# Patient Record
Sex: FEMALE | Race: BLACK OR AFRICAN AMERICAN | NOT HISPANIC OR LATINO | Employment: FULL TIME | URBAN - METROPOLITAN AREA
[De-identification: names, ages, dates, MRNs, and addresses within clinical notes are randomized per-mention and may not be internally consistent; named-entity substitution may affect disease eponyms.]

---

## 2020-11-12 ENCOUNTER — HOSPITAL ENCOUNTER (EMERGENCY)
Facility: HOSPITAL | Age: 45
Discharge: HOME | End: 2020-11-12
Attending: FAMILY MEDICINE
Payer: COMMERCIAL

## 2020-11-12 VITALS
RESPIRATION RATE: 20 BRPM | TEMPERATURE: 99 F | HEIGHT: 62 IN | OXYGEN SATURATION: 97 % | DIASTOLIC BLOOD PRESSURE: 81 MMHG | BODY MASS INDEX: 40.57 KG/M2 | WEIGHT: 220.44 LBS | HEART RATE: 101 BPM | SYSTOLIC BLOOD PRESSURE: 169 MMHG

## 2020-11-12 DIAGNOSIS — R04.0 EPISTAXIS: Primary | ICD-10-CM

## 2020-11-12 PROCEDURE — 99282 EMERGENCY DEPT VISIT SF MDM: CPT

## 2020-11-12 RX ORDER — OXYMETAZOLINE HCL 0.05 %
1 SPRAY, NON-AEROSOL (ML) NASAL
Status: DISCONTINUED | OUTPATIENT
Start: 2020-11-12 | End: 2020-11-12

## 2020-11-13 NOTE — DISCHARGE INSTRUCTIONS
Regarding EPISTAXIS, for treatment, I advised the patient to: sit up and lean forward to prevent the swallowing of blood; spit blood and saliva into a bowl; apply pressure to nose using 2 fingers to pinch nose shut for 10 minutes; breathe through your mouth; apply ice (use a cold pack or put crushed ice in a bag, cover with a towel, and place on the bridge of nose); and consider nasal packing by using a cotton ball, tissue, or gauze bandage inserted into nostril to stop the bleeding. To prevent epistaxis: instructed patient to avoid nose picking and blowing nose too hard; avoid irritants such as tobacco smoke and chemical sprays such as  that contain ammonia; use a cool mist humidifier to add moisture to the air and help keep nose moist; and apply a small amount of petroleum jelly inside nostrils to help keep nose from drying out or getting irritated. Instructed patient to follow up with primary healthcare provider or otolaryngologist as soon as possible for further evaluation and treatment and to return to the ER if they experience difficulty breathing or talking, weakness, or for any complications or concerns.

## 2020-11-13 NOTE — ED PROVIDER NOTES
SCRIBE #1 NOTE: I, Wild Nguyễn, am scribing for, and in the presence of, Eleonora Jeffrey MD. I have scribed the entire note.      History      Chief Complaint   Patient presents with    Epistaxis     2 episodes today ,resolved at this time.        Review of patient's allergies indicates:  No Known Allergies     HPI   HPI    11/12/2020, 10:55 PM   History obtained from the patient      History of Present Illness: Melissa Small is a 44 y.o. female patient who presents to the Emergency Department for epistaxis which onset suddenly 4 hours ago. Pt reports 2 episodes of epistaxis today( 3 PM and 7 PM) and has resolved at this time. Pt reports a prior occurrence a while back. Pt noted blood clots from her nosebleeds. Symptoms are episodic and moderate in severity. No mitigating or exacerbating factors reported. No associated sxs included. Patient denies any fatigue, CP, SOB, palpitations, HA, dizziness, extremity weakness/numbness/tingling, and all other sxs at this time. No prior Tx included. Pt states she is not on blood thinners. No further complaints or concerns at this time.         Arrival mode: Personal vehicle     PCP: Primary Doctor No       Past Medical History:  History reviewed. No pertinent medical history.     Past Surgical History:  History reviewed. No pertinent surgical history.     Family History:  History reviewed. No pertinent family history.     Social History:  Social History     Tobacco Use    Smoking status: Unknown   Substance and Sexual Activity    Alcohol use: Unknown    Drug use: Unknown    Sexual activity: Unknown       ROS   Review of Systems   Constitutional: Negative for fatigue and fever.   HENT: Positive for nosebleeds (2 episodes). Negative for sore throat.    Respiratory: Negative for shortness of breath.    Cardiovascular: Negative for chest pain and palpitations.   Gastrointestinal: Negative for nausea.   Genitourinary: Negative for dysuria.   Musculoskeletal: Negative for  "back pain.   Skin: Negative for rash.   Neurological: Negative for dizziness, weakness, numbness and headaches.   Hematological: Does not bruise/bleed easily.   All other systems reviewed and are negative.    Physical Exam      Initial Vitals [11/12/20 2001]   BP Pulse Resp Temp SpO2   (!) 169/81 101 20 98.7 °F (37.1 °C) 97 %      MAP       --          Physical Exam  Nursing Notes and Vital Signs Reviewed.  Constitutional: Patient is in no acute distress. Well-developed and well-nourished.  Head: Atraumatic. Normocephalic.  Eyes: PERRL. EOM intact. Conjunctivae are not pale. No scleral icterus.  ENT: Mucous membranes are moist. Oropharynx is clear and symmetric. No active bleeding. No septal hematoma. No posterior pharynx bleeding.     Neck: Supple. Full ROM. No lymphadenopathy.  Cardiovascular: Regular rate. Regular rhythm. No murmurs, rubs, or gallops. Distal pulses are 2+ and symmetric.  Pulmonary/Chest: No respiratory distress. Clear to auscultation bilaterally. No wheezing or rales.  Abdominal: Soft and non-distended.  There is no tenderness.  No rebound, guarding, or rigidity. Good bowel sounds.  Genitourinary: No CVA tenderness  Musculoskeletal: Moves all extremities. No obvious deformities. No edema. No calf tenderness.  Skin: Warm and dry.  Neurological:  Alert, awake, and appropriate.  Normal speech.  No acute focal neurological deficits are appreciated.  Psychiatric: Normal affect. Good eye contact. Appropriate in content.    ED Course    Procedures  ED Vital Signs:  Vitals:    11/12/20 2001   BP: (!) 169/81   Pulse: 101   Resp: 20   Temp: 98.7 °F (37.1 °C)   TempSrc: Oral   SpO2: 97%   Weight: 100 kg (220 lb 7.4 oz)   Height: 5' 2" (1.575 m)       Imaging Results:  Imaging Results    None                 The Emergency Provider reviewed the vital signs and test results, which are outlined above.    ED Discussion     11:00 PM: Reassessed pt at this time. Did not require afrin in ED. Discussed how to hold " pressure for future nosebleeds. Pt states her condition has improved at this time. Discussed with pt all pertinent ED information and results. Discussed pt dx and plan of tx. Gave pt all f/u and return to the ED instructions. All questions and concerns were addressed at this time. Pt expresses understanding of information and instructions, and is comfortable with plan to discharge. Pt is stable for discharge.    I discussed with patient and/or family/caretaker that evaluation in the ED does not suggest any emergent or life threatening medical conditions requiring immediate intervention beyond what was provided in the ED, and I believe patient is safe for discharge.  Regardless, an unremarkable evaluation in the ED does not preclude the development or presence of a serious of life threatening condition. As such, patient was instructed to return immediately for any worsening or change in current symptoms.         ED Medication(s):  Medications - No data to display    New Prescriptions    No medications on file        Medication List      You have not been prescribed any medications.         Follow-up Information     [facility]. Schedule an appointment as soon as possible for a visit in 2 days.    Why: As needed, If symptoms worsen  Contact information:  [address] [facility] [address]  [phone]                     Medical Decision Making              Scribe Attestation:   Scribe #1: I performed the above scribed service and the documentation accurately describes the services I performed. I attest to the accuracy of the note.    Attending:   Physician Attestation Statement for Scribe #1: I, Eleonora Jeffrey MD, personally performed the services described in this documentation, as scribed by Wild Nguyễn, in my presence, and it is both accurate and complete.          Clinical Impression       ICD-10-CM ICD-9-CM   1. Epistaxis  R04.0 784.7       Disposition:   Disposition:  "Discharged  Condition: Stable    Portions of this note may have been created with voice recognition software. Occasional "wrong-word" or "sound-a-like" substitutions may have occurred due to the inherent limitations of voice recognition software. Please, read the note carefully and recognize, using context, where substitutions have occurred.          Eleonora Jeffrey MD  11/14/20 1211    "